# Patient Record
Sex: FEMALE | Race: WHITE | NOT HISPANIC OR LATINO | Employment: UNEMPLOYED | URBAN - METROPOLITAN AREA
[De-identification: names, ages, dates, MRNs, and addresses within clinical notes are randomized per-mention and may not be internally consistent; named-entity substitution may affect disease eponyms.]

---

## 2018-08-01 ENCOUNTER — OFFICE VISIT (OUTPATIENT)
Dept: OBGYN CLINIC | Facility: CLINIC | Age: 38
End: 2018-08-01

## 2018-08-01 VITALS
HEIGHT: 63 IN | DIASTOLIC BLOOD PRESSURE: 82 MMHG | BODY MASS INDEX: 18.78 KG/M2 | SYSTOLIC BLOOD PRESSURE: 118 MMHG | WEIGHT: 106 LBS

## 2018-08-01 DIAGNOSIS — Z11.3 SCREEN FOR STD (SEXUALLY TRANSMITTED DISEASE): ICD-10-CM

## 2018-08-01 DIAGNOSIS — Z11.51 SCREENING FOR HUMAN PAPILLOMAVIRUS (HPV): ICD-10-CM

## 2018-08-01 DIAGNOSIS — R30.0 BURNING WITH URINATION: ICD-10-CM

## 2018-08-01 DIAGNOSIS — N89.8 VAGINA ITCHING: ICD-10-CM

## 2018-08-01 DIAGNOSIS — Z01.419 ENCNTR FOR GYN EXAM (GENERAL) (ROUTINE) W/O ABN FINDINGS: Primary | ICD-10-CM

## 2018-08-01 PROCEDURE — 87624 HPV HI-RISK TYP POOLED RSLT: CPT | Performed by: PHYSICIAN ASSISTANT

## 2018-08-01 PROCEDURE — 87491 CHLMYD TRACH DNA AMP PROBE: CPT | Performed by: PHYSICIAN ASSISTANT

## 2018-08-01 PROCEDURE — G0145 SCR C/V CYTO,THINLAYER,RESCR: HCPCS | Performed by: PHYSICIAN ASSISTANT

## 2018-08-01 PROCEDURE — 87086 URINE CULTURE/COLONY COUNT: CPT | Performed by: PHYSICIAN ASSISTANT

## 2018-08-01 PROCEDURE — 87660 TRICHOMONAS VAGIN DIR PROBE: CPT | Performed by: PHYSICIAN ASSISTANT

## 2018-08-01 PROCEDURE — 87186 SC STD MICRODIL/AGAR DIL: CPT | Performed by: PHYSICIAN ASSISTANT

## 2018-08-01 PROCEDURE — 99385 PREV VISIT NEW AGE 18-39: CPT | Performed by: PHYSICIAN ASSISTANT

## 2018-08-01 PROCEDURE — 87591 N.GONORRHOEAE DNA AMP PROB: CPT | Performed by: PHYSICIAN ASSISTANT

## 2018-08-01 PROCEDURE — 87510 GARDNER VAG DNA DIR PROBE: CPT | Performed by: PHYSICIAN ASSISTANT

## 2018-08-01 PROCEDURE — 87480 CANDIDA DNA DIR PROBE: CPT | Performed by: PHYSICIAN ASSISTANT

## 2018-08-01 PROCEDURE — 87077 CULTURE AEROBIC IDENTIFY: CPT | Performed by: PHYSICIAN ASSISTANT

## 2018-08-01 RX ORDER — IBUPROFEN 200 MG
TABLET ORAL EVERY 6 HOURS PRN
COMMUNITY
End: 2019-03-28 | Stop reason: ALTCHOICE

## 2018-08-01 RX ORDER — FLUCONAZOLE 100 MG/1
100 TABLET ORAL DAILY
Qty: 7 TABLET | Refills: 0 | Status: SHIPPED | OUTPATIENT
Start: 2018-08-01 | End: 2018-08-08

## 2018-08-01 NOTE — ASSESSMENT & PLAN NOTE
Yeast on wet mount, rx diflucan sent via EMR, 7 day course due to recurrent symptoms  Affirm sent out to check for concurrent BV infection, will call if abnormal  rec rephresh proB and 1 wk abstinence   Call or RTO if symptoms worsen or do not improve

## 2018-08-01 NOTE — PROGRESS NOTES
Bladder discomfort dull  uti x 2 relieved with antibiotics  Burning and vag dryness ,   Yeast inf cottage cheese d/c treated monistat supp 3 days ago,   No discharge now  Assessment/Plan  Problem List Items Addressed This Visit     Encntr for gyn exam (general) (routine) w/o abn findings - Primary     Annual exam and pap performed, will call if abnormal or send letter if normal  RTO 1 year         Relevant Orders    Liquid-based pap, screening    Vagina itching     Yeast on wet mount, rx diflucan sent via EMR, 7 day course due to recurrent symptoms  Affirm sent out to check for concurrent BV infection, will call if abnormal  rec rephresh proB and 1 wk abstinence   Call or RTO if symptoms worsen or do not improve         Relevant Medications    fluconazole (DIFLUCAN) 100 mg tablet    Other Relevant Orders    Chlamydia/GC amplified DNA by PCR    VAGINOSIS DNA PROBE (AFFIRM)    Burning with urination     Urine C&S sent out will call if abnormal         Relevant Orders    Urine culture      Other Visit Diagnoses     Screening for human papillomavirus (HPV)        Relevant Orders    Liquid-based pap, screening    Screen for STD (sexually transmitted disease)        Relevant Orders    Chlamydia/GC amplified DNA by PCR        [facility]  # [id number] used to get history and examine patient    Vidya Mccormack is a 45 y o  female who presents for a new patient annual GYN exam   Periods are regular every 28-30 days  Dysmenorrhea:mild, occurring first 1-2 days of flow  She denies intermenstrual bleeding, spotting, or discharge  She reports that she is sexually active with 1 partner and using vasectomy for contraception  Patient also c/o vaginal burning, dryness and irritation x 1 months  Patient had UTI x 2 and on 2 different antibiotics  After done with antibiotics had thick white cheesy discharge and vaginal itching  She used an OTC yeast treatment with some relief    After the OTC treatment the discharge and itching resolved but the irritation and dryness remained  Symptoms aggravated with intercourse and she denies any alleviating factors  She denies any odor, abdominal pain, fever, chills, or dysuria  However patient would like to confirm that UTI resolved  She is taking a GI OTC probiotic and cranberry pills  Last Pap smear: unknown  Regular self breast exam: no    Family history of uterine or ovarian cancer: yes mother  Family history of breast cancer: no  Family history of colon cancer: no    Menstrual History:  OB History      Para Term  AB Living    1 1       1    SAB TAB Ectopic Multiple Live Births            1         Patient's last menstrual period was 2018 (exact date)  Period Pattern: Regular  Menstrual Flow: Moderate    Past Medical History:   Diagnosis Date    Bacterial cystitis     Hematuria     Pelvic pain      Past Surgical History:   Procedure Laterality Date    NO PAST SURGERIES       Family History   Problem Relation Age of Onset    Uterine cancer Mother     No Known Problems Father        Review of Systems  Review of Systems   Constitutional: Negative for chills, fatigue and fever  Respiratory: Negative for shortness of breath  Cardiovascular: Negative for chest pain  Gastrointestinal: Negative for abdominal pain  Genitourinary: Positive for vaginal pain  Negative for dysuria, frequency, genital sores, hematuria, pelvic pain, urgency, vaginal bleeding and vaginal discharge  Negative for breast pain or lumps  Negative for stress urinary incontinence  See HPI   Neurological: Negative for headaches  Objective   /82 (BP Location: Left arm, Patient Position: Sitting, Cuff Size: Standard)   Ht 5' 2 5" (1 588 m)   Wt 48 1 kg (106 lb)   LMP 2018 (Exact Date)   BMI 19 08 kg/m²     Physical Exam   Constitutional: She is oriented to person, place, and time  She appears well-developed and well-nourished     Neck: No thyromegaly present  Cardiovascular: Normal rate and regular rhythm  Pulmonary/Chest: Effort normal and breath sounds normal  Right breast exhibits no mass, no nipple discharge, no skin change and no tenderness  Left breast exhibits no mass, no nipple discharge, no skin change and no tenderness  Abdominal: Soft  There is no tenderness  There is no rebound and no guarding  Genitourinary: There is no rash, tenderness or lesion on the right labia  There is no rash, tenderness or lesion on the left labia  Uterus is not enlarged and not tender  Cervix exhibits no motion tenderness and no discharge  Right adnexum displays no mass and no tenderness  Left adnexum displays no mass and no tenderness  No bleeding in the vagina  Vaginal discharge (thick white d/c, pH 4 5, negative KOH whiff , no clue cells on wet mount, (+) scant yeast with hyphae) found  Genitourinary Comments: Pap performed   Neurological: She is alert and oriented to person, place, and time  Psychiatric: She has a normal mood and affect  Nursing note and vitals reviewed

## 2018-08-02 DIAGNOSIS — N39.0 URINARY TRACT INFECTION WITHOUT HEMATURIA, SITE UNSPECIFIED: Primary | ICD-10-CM

## 2018-08-02 DIAGNOSIS — N30.00 ACUTE CYSTITIS WITHOUT HEMATURIA: Primary | ICD-10-CM

## 2018-08-02 LAB
CHLAMYDIA DNA CVX QL NAA+PROBE: NORMAL
N GONORRHOEA DNA GENITAL QL NAA+PROBE: NORMAL

## 2018-08-02 RX ORDER — NITROFURANTOIN 25; 75 MG/1; MG/1
100 CAPSULE ORAL 2 TIMES DAILY
Qty: 10 CAPSULE | Refills: 0 | Status: SHIPPED | OUTPATIENT
Start: 2018-08-02 | End: 2018-08-07

## 2018-08-02 NOTE — TELEPHONE ENCOUNTER
Called pt - spoke with pt  Indira Yen - explained the results  He stated he would like to speak with the provider due to the antibiotics being prescribed

## 2018-08-02 NOTE — TELEPHONE ENCOUNTER
----- Message from Jesu Agustin PA-C sent at 8/2/2018  6:00 PM EDT -----  Please call the patient regarding her abnormal result  Will send in rx for UTI   Pt does not speak [address]   will likely answer phone

## 2018-08-02 NOTE — TELEPHONE ENCOUNTER
pts  Slime Rodriguez called again asking for a call back TONIGHT regarding hiS wife & possibly looking to talk to a Doctor

## 2018-08-03 ENCOUNTER — TELEPHONE (OUTPATIENT)
Dept: OBGYN CLINIC | Facility: CLINIC | Age: 38
End: 2018-08-03

## 2018-08-03 LAB
BACTERIA UR CULT: ABNORMAL
CANDIDA RRNA VAG QL PROBE: NEGATIVE
G VAGINALIS RRNA GENITAL QL PROBE: NEGATIVE
HPV RRNA GENITAL QL NAA+PROBE: NORMAL
LAB AP GYN PRIMARY INTERPRETATION: NORMAL
Lab: NORMAL
T VAGINALIS RRNA GENITAL QL PROBE: NEGATIVE

## 2018-08-03 RX ORDER — CEPHALEXIN 250 MG/1
500 CAPSULE ORAL 4 TIMES DAILY
Qty: 28 CAPSULE | Refills: 0 | Status: SHIPPED | OUTPATIENT
Start: 2018-08-03 | End: 2018-08-10

## 2018-08-03 NOTE — TELEPHONE ENCOUNTER
I spoke with Wendy Martínez, he wanted us to prescribe antral, I told him our doctor is not familiar with this and we would refer him to a PCP  I told him if she is having liver pain she should go to urgent care  I did offer to send her for lab work, he did not say anything

## 2018-08-03 NOTE — TELEPHONE ENCOUNTER
This was in our task bin so I was not sure if Hien called or was asking us to call  I called and s/w  Corinne Crew  He did speak with Hien last night and wanted to speak with her again  I told him she was not available today  He said his wife took 1 antibiotic and had liver pain last night  He said she had liver failure in 2009-toxic hepatitis  He is asking us to prescribe antral for her liver  I asked him if she has a physician here that follows her liver, he said no, not in this country  Please advise

## 2018-08-03 NOTE — TELEPHONE ENCOUNTER
I don't know what antral is  I can refer them to a PCP or there is urgent care  We can also always order a liver panel for her- a cmp to check her enzymes

## 2018-08-03 NOTE — TELEPHONE ENCOUNTER
Nichelle Rees has called back again  He keeps asking for a different medication  He says that after she took 1 pill her liver hurts, the back of her head hurts and her hands and legs feel heavy  I asked if she has swelling, he said she did not mention  He keeps saying her liver hurts and wants a different pill or one she only has to take once a day   Please advise

## 2018-08-03 NOTE — TELEPHONE ENCOUNTER
I spoke with  Rosa Krause again  I told him we can prescribe Keflex instead  Then he wanted to know if she could just take the original one once a day  He told me she took it once and her liver hurt so I asked him why she would want to do that  I also told him if her liver continues to hurt to go to the ER   Order placed for Keflex

## 2018-09-17 DIAGNOSIS — N39.0 FREQUENT UTI: Primary | ICD-10-CM

## 2018-09-17 RX ORDER — NITROFURANTOIN 25; 75 MG/1; MG/1
CAPSULE ORAL
Qty: 10 CAPSULE | Refills: 0 | Status: SHIPPED | OUTPATIENT
Start: 2018-09-17 | End: 2019-03-28 | Stop reason: ALTCHOICE

## 2018-09-17 NOTE — TELEPHONE ENCOUNTER
Called pt  back - explained to him that the pt needs to go for a u/a and urine culture  The orders are in pt chart  Also advised the Hien recommends that pt see a urologist due to recurrent UTI's  Rx for Macrobid in pt chart  Please sign  Thanks!

## 2018-09-17 NOTE — TELEPHONE ENCOUNTER
Needs urine culture and sensitivity, give lab order then can send in rx for macrobid  Also rec seeing urologist as I believe this is 3rd or 4th UTI in a few months

## 2018-09-17 NOTE — TELEPHONE ENCOUNTER
Spoke with pt  Dee Mcgarry - patient was seen at the beginning of August - was prescribed Keflex  Patient states the symptoms are back - it started on Saturday  She has frequent urination, abdominal pressure and burning when urinating  Patient would like to have a refill of the Keflex  Please advise  Thanks!

## 2018-09-24 ENCOUNTER — TELEPHONE (OUTPATIENT)
Dept: UROLOGY | Facility: MEDICAL CENTER | Age: 38
End: 2018-09-24

## 2018-09-24 NOTE — TELEPHONE ENCOUNTER
Complaint/Diagnosis:Frequent UTI'S    Insurance:[employer] PPO    History of Cancer:NO    Previous Urologist:NO    Outside testing/where:     If yes,what kind:    Records requested/where:    Preferred location:[facility]

## 2018-10-03 ENCOUNTER — TELEPHONE (OUTPATIENT)
Dept: UROLOGY | Facility: MEDICAL CENTER | Age: 38
End: 2018-10-03

## 2018-10-05 ENCOUNTER — OFFICE VISIT (OUTPATIENT)
Dept: UROLOGY | Facility: MEDICAL CENTER | Age: 38
End: 2018-10-05
Payer: COMMERCIAL

## 2018-10-05 VITALS
SYSTOLIC BLOOD PRESSURE: 118 MMHG | BODY MASS INDEX: 20.58 KG/M2 | HEIGHT: 61 IN | WEIGHT: 109 LBS | DIASTOLIC BLOOD PRESSURE: 70 MMHG

## 2018-10-05 DIAGNOSIS — N39.0 RECURRENT UTI: Primary | ICD-10-CM

## 2018-10-05 PROBLEM — N30.01 ACUTE CYSTITIS WITH HEMATURIA: Status: ACTIVE | Noted: 2018-10-05

## 2018-10-05 LAB
SL AMB  POCT GLUCOSE, UA: ABNORMAL
SL AMB LEUKOCYTE ESTERASE,UA: ABNORMAL
SL AMB POCT BILIRUBIN,UA: ABNORMAL
SL AMB POCT BLOOD,UA: ABNORMAL
SL AMB POCT CLARITY,UA: CLEAR
SL AMB POCT COLOR,UA: YELLOW
SL AMB POCT KETONES,UA: ABNORMAL
SL AMB POCT NITRITE,UA: ABNORMAL
SL AMB POCT PH,UA: 7.5
SL AMB POCT SPECIFIC GRAVITY,UA: 1.01
SL AMB POCT URINE PROTEIN: ABNORMAL
SL AMB POCT UROBILINOGEN: 0.2

## 2018-10-05 PROCEDURE — 99244 OFF/OP CNSLTJ NEW/EST MOD 40: CPT | Performed by: UROLOGY

## 2018-10-05 PROCEDURE — 81003 URINALYSIS AUTO W/O SCOPE: CPT | Performed by: UROLOGY

## 2018-10-05 PROCEDURE — 87086 URINE CULTURE/COLONY COUNT: CPT | Performed by: UROLOGY

## 2018-10-05 RX ORDER — FLUCONAZOLE 100 MG/1
100 TABLET ORAL DAILY
Qty: 15 TABLET | Refills: 0 | Status: SHIPPED | OUTPATIENT
Start: 2018-10-05 | End: 2018-10-20

## 2018-10-05 RX ORDER — NITROFURANTOIN MACROCRYSTALS 50 MG/1
CAPSULE ORAL
Qty: 90 CAPSULE | Refills: 2 | Status: SHIPPED | OUTPATIENT
Start: 2018-10-05 | End: 2019-03-28 | Stop reason: ALTCHOICE

## 2018-10-05 RX ORDER — FLUCONAZOLE 100 MG/1
100 TABLET ORAL DAILY
Qty: 15 TABLET | Refills: 1 | Status: SHIPPED | OUTPATIENT
Start: 2018-10-05 | End: 2018-10-05 | Stop reason: SDUPTHER

## 2018-10-06 LAB — BACTERIA UR CULT: NORMAL

## 2018-10-10 ENCOUNTER — TELEPHONE (OUTPATIENT)
Dept: UROLOGY | Facility: MEDICAL CENTER | Age: 38
End: 2018-10-10

## 2018-10-10 NOTE — TELEPHONE ENCOUNTER
Spoke with Charlie Juarez and gave him negative u/c results  Told him pt should have DON done as scheduled this week  She can start the Nitrofurantoin as ordered also  He understands all instructions

## 2018-10-12 ENCOUNTER — HOSPITAL ENCOUNTER (OUTPATIENT)
Dept: ULTRASOUND IMAGING | Facility: HOSPITAL | Age: 38
Discharge: HOME | End: 2018-10-12
Attending: UROLOGY
Payer: COMMERCIAL

## 2018-10-12 DIAGNOSIS — N30.01 ACUTE CYSTITIS WITH HEMATURIA: ICD-10-CM

## 2018-10-12 PROCEDURE — 51798 US URINE CAPACITY MEASURE: CPT

## 2018-10-22 ENCOUNTER — TELEPHONE (OUTPATIENT)
Dept: UROLOGY | Facility: MEDICAL CENTER | Age: 38
End: 2018-10-22

## 2018-10-22 DIAGNOSIS — R30.0 DYSURIA: Primary | ICD-10-CM

## 2018-10-22 NOTE — TELEPHONE ENCOUNTER
MD Rajwinder Saba [facility]  For Urology [place] [address] [facility]             Sonogram normal    I had recommended cystoscopy, I am not sure if she decided to have it or not      LMOM to return call for results and to see if she decided on the cystoscopy

## 2018-10-23 DIAGNOSIS — N30.01 ACUTE CYSTITIS WITH HEMATURIA: Primary | ICD-10-CM

## 2018-10-23 NOTE — TELEPHONE ENCOUNTER
Zoë Driscoll returned call and stated that she stopped the macrodantin because it gave her a cough and chest pain  Pt also requested UC having dysuria  Pt wants to hold off on the cystoscopy until get the medication correct  Will forward to Dr Shereen Stewart to advise

## 2018-10-30 NOTE — TELEPHONE ENCOUNTER
Segundo calling back regarding this issue  The patient never heard from the doctor and they want to know what is going on  Please call Segundo back

## 2018-10-30 NOTE — TELEPHONE ENCOUNTER
Spoke to   Pt never repeated culture that was ordered 10/23  Explained Dr Velia Fuchs awaiting results prior to ordering med  since last culture was neg  Pt and  expected call from Dr Velia Fuchs re: different medication   will have pt do culture and take to [facility]   instructed to call the office if no response in 72 hours

## 2018-11-02 NOTE — PROGRESS NOTES
Assessment/Plan:  1  Needs evaluation for recurrent UTIs with renal ultrasound and cystoscopy  2   Start Macrodantin 50 mg per day suppression  3   She is not sure if she wants cystoscopy, she will call  4   Culture urine today  No problem-specific Assessment & Plan notes found for this encounter  Diagnoses and all orders for this visit:    Acute cystitis with hematuria  -     POCT urine dip auto non-scope  -     US kidney and bladder with pvr; Future  -     nitrofurantoin (MACRODANTIN) 50 mg capsule; ONE DAILY  -     Urine culture  -     Discontinue: fluconazole (DIFLUCAN) 100 mg tablet; Take 1 tablet (100 mg total) by mouth daily for 15 days ONE DAILY AS NEEDED FOR YEAST INFECTION  -     fluconazole (DIFLUCAN) 100 mg tablet; Take 1 tablet (100 mg total) by mouth daily for 15 days ONE DAILY AS NEEDED FOR YEAST INFECTION          Subjective:      Patient ID: Daniel Aguirre is a 45 y o  female  Evaluation for recurrent UTIs  September one, had E coli infection with urgency, frequency, burning  Several other episodes of similar symptoms, usually clear with antibiotics  Unclear father cultures done  No hematuria stones prior urology procedures  Good urinary stream and control, no incontinence  She says she had normal ultrasound of kidneys a year ago in [facility]  The following portions of the patient's history were reviewed and updated as appropriate: allergies, current medications, past family history, past medical history, past social history, past surgical history and problem list     Review of Systems   All other systems reviewed and are negative  Objective:      /70 (BP Location: Left arm, Patient Position: Sitting)   Ht 5' 1" (1 549 m)   Wt 49 4 kg (109 lb)   BMI 20 60 kg/m²          Physical Exam   Constitutional: She is oriented to person, place, and time  She appears well-developed and well-nourished  No distress  HENT:   Head: Normocephalic and atraumatic  Eyes: Conjunctivae are normal    Cardiovascular: Normal rate and regular rhythm  Pulmonary/Chest: Effort normal and breath sounds normal  No respiratory distress  She has no wheezes  Abdominal: Soft  Bowel sounds are normal  She exhibits no distension and no mass  There is no tenderness  Neurological: She is alert and oriented to person, place, and time  Skin: Skin is warm and dry  She is not diaphoretic

## 2018-11-09 ENCOUNTER — APPOINTMENT (OUTPATIENT)
Dept: LAB | Facility: CLINIC | Age: 38
End: 2018-11-09
Payer: COMMERCIAL

## 2018-11-09 DIAGNOSIS — R30.0 DYSURIA: ICD-10-CM

## 2018-11-09 DIAGNOSIS — N39.0 FREQUENT UTI: ICD-10-CM

## 2018-11-09 DIAGNOSIS — N30.01 ACUTE CYSTITIS WITH HEMATURIA: ICD-10-CM

## 2018-11-09 LAB
BACTERIA UR QL AUTO: NORMAL /HPF
BILIRUB UR QL STRIP: NEGATIVE
CLARITY UR: CLEAR
COLOR UR: YELLOW
GLUCOSE UR STRIP-MCNC: NEGATIVE MG/DL
HGB UR QL STRIP.AUTO: NEGATIVE
HYALINE CASTS #/AREA URNS LPF: NORMAL /LPF
KETONES UR STRIP-MCNC: NEGATIVE MG/DL
LEUKOCYTE ESTERASE UR QL STRIP: NEGATIVE
NITRITE UR QL STRIP: NEGATIVE
NON-SQ EPI CELLS URNS QL MICRO: NORMAL /HPF
PH UR STRIP.AUTO: 6.5 [PH] (ref 4.5–8)
PROT UR STRIP-MCNC: NEGATIVE MG/DL
RBC #/AREA URNS AUTO: NORMAL /HPF
SP GR UR STRIP.AUTO: 1 (ref 1–1.03)
UROBILINOGEN UR QL STRIP.AUTO: 0.2 E.U./DL
WBC #/AREA URNS AUTO: NORMAL /HPF

## 2018-11-09 PROCEDURE — 87086 URINE CULTURE/COLONY COUNT: CPT

## 2018-11-09 PROCEDURE — 81001 URINALYSIS AUTO W/SCOPE: CPT

## 2018-11-10 LAB — BACTERIA UR CULT: NORMAL

## 2018-11-14 ENCOUNTER — TELEPHONE (OUTPATIENT)
Dept: OBGYN CLINIC | Facility: CLINIC | Age: 38
End: 2018-11-14

## 2018-11-14 NOTE — TELEPHONE ENCOUNTER
Pt's  calling for culture results we were disconnected before I could tell him I would send triage message   [phone]

## 2018-11-14 NOTE — TELEPHONE ENCOUNTER
Spoke to pt's spouse and stated the culture done on 11/9 was negative  Pt's spouse said pt stopped the Nitrofurantoin because she had coughing  Pt's spouse would like to know what the next step would be and would not make another appt until another doctor looks at the labs and last encounter  Will ask Dr Omar Campos for advisement

## 2018-11-14 NOTE — TELEPHONE ENCOUNTER
Pt's spouse, Kedar Chavez, left vm message today for staff to call him back regarding Pt's recent urine culture result  He can be reached @ [phone]

## 2018-11-15 NOTE — TELEPHONE ENCOUNTER
LMOM for pt's  to call back  Per Dr Regina Torres last note pt should have a cystoscopy  After looking at the notes Dr Porsha Agustin agrees  Pt needs to make a cysto appt

## 2018-11-16 NOTE — TELEPHONE ENCOUNTER
Spoke to Pt's  and gave him Dr Barrientos Slider advisement per Dr Wojciech Masterson last note  Pt needs to schedule a cystoscopy to find out what is going on in the bladder  Pt's  will relay the message and wcb

## 2019-03-28 ENCOUNTER — OFFICE VISIT (OUTPATIENT)
Dept: OBGYN CLINIC | Facility: CLINIC | Age: 39
End: 2019-03-28
Payer: COMMERCIAL

## 2019-03-28 VITALS — DIASTOLIC BLOOD PRESSURE: 60 MMHG | SYSTOLIC BLOOD PRESSURE: 110 MMHG | WEIGHT: 113.8 LBS | BODY MASS INDEX: 21.5 KG/M2

## 2019-03-28 DIAGNOSIS — R10.30 LOWER ABDOMINAL PAIN: Primary | ICD-10-CM

## 2019-03-28 LAB
BACTERIA UR QL AUTO: NORMAL /HPF
BILIRUB UR QL STRIP: NEGATIVE
CLARITY UR: CLEAR
COLOR UR: YELLOW
GLUCOSE UR STRIP-MCNC: NEGATIVE MG/DL
HGB UR QL STRIP.AUTO: NEGATIVE
HYALINE CASTS #/AREA URNS LPF: NORMAL /LPF
KETONES UR STRIP-MCNC: NEGATIVE MG/DL
LEUKOCYTE ESTERASE UR QL STRIP: NEGATIVE
NITRITE UR QL STRIP: NEGATIVE
NON-SQ EPI CELLS URNS QL MICRO: NORMAL /HPF
PH UR STRIP.AUTO: 6.5 [PH]
PROT UR STRIP-MCNC: NEGATIVE MG/DL
RBC #/AREA URNS AUTO: NORMAL /HPF
SL AMB  POCT GLUCOSE, UA: NORMAL
SL AMB LEUKOCYTE ESTERASE,UA: NORMAL
SL AMB POCT BILIRUBIN,UA: NORMAL
SL AMB POCT BLOOD,UA: NORMAL
SL AMB POCT CLARITY,UA: CLEAR
SL AMB POCT COLOR,UA: NORMAL
SL AMB POCT KETONES,UA: NORMAL
SL AMB POCT NITRITE,UA: NORMAL
SL AMB POCT PH,UA: 5
SL AMB POCT SPECIFIC GRAVITY,UA: 1.01
SL AMB POCT URINE PROTEIN: NORMAL
SL AMB POCT UROBILINOGEN: NORMAL
SP GR UR STRIP.AUTO: 1 (ref 1–1.03)
UROBILINOGEN UR QL STRIP.AUTO: 0.2 E.U./DL
WBC #/AREA URNS AUTO: NORMAL /HPF

## 2019-03-28 PROCEDURE — 87086 URINE CULTURE/COLONY COUNT: CPT | Performed by: OBSTETRICS & GYNECOLOGY

## 2019-03-28 PROCEDURE — 87591 N.GONORRHOEAE DNA AMP PROB: CPT | Performed by: OBSTETRICS & GYNECOLOGY

## 2019-03-28 PROCEDURE — 87491 CHLMYD TRACH DNA AMP PROBE: CPT | Performed by: OBSTETRICS & GYNECOLOGY

## 2019-03-28 PROCEDURE — 99213 OFFICE O/P EST LOW 20 MIN: CPT | Performed by: OBSTETRICS & GYNECOLOGY

## 2019-03-28 PROCEDURE — 81001 URINALYSIS AUTO W/SCOPE: CPT | Performed by: OBSTETRICS & GYNECOLOGY

## 2019-03-28 RX ORDER — METRONIDAZOLE 500 MG/1
500 TABLET ORAL 2 TIMES DAILY
Qty: 14 TABLET | Refills: 0 | Status: SHIPPED | OUTPATIENT
Start: 2019-03-28 | End: 2019-04-04

## 2019-03-28 RX ORDER — FLUCONAZOLE 150 MG/1
150 TABLET ORAL ONCE
Qty: 1 TABLET | Refills: 0 | Status: SHIPPED | OUTPATIENT
Start: 2019-03-28 | End: 2019-03-28

## 2019-03-28 NOTE — PROGRESS NOTES
Chief Complaint   Patient presents with   Jessica Courser Gynecology Problem     Patient is complaining of intermittent pelvic pain for 1 week now  Her  is with her to help with translation  Chele Pascual is here for pelvic pain  She states she has had this bilateral pelvic pain for the past 3 weeks however it has been worse over the past week  It will come and go, but she will have the pain daily  She denies any fevers or chills  No dysuria urgency or frequency  She is not constipated however she does sometimes feel like she is having gas pain  Sometimes the pain will radiate to her lower back or her growing  The other day it did feel like she had pulled a muscle in her groin  She is sexually active and does not have worsening pain with intercourse  She does not have worsening pain with her periods  She is sexually active with her   She has had a few bladder infections in the past       Her  is here to translate per her request she speaks [place] she declined translation with her phone service        No current outpatient medications on file        Allergies   Allergen Reactions    Penicillins GI Intolerance    Nitrofurantoin Cough         Past Medical History:   Diagnosis Date    Bacterial cystitis     Hematuria     Pelvic pain          Past Surgical History:   Procedure Laterality Date    NO PAST SURGERIES      OVARIAN CYST REMOVAL Left          OB History    Para Term  AB Living   1 1       1   SAB TAB Ectopic Multiple Live Births           1      # Outcome Date GA Lbr Christian/2nd Weight Sex Delivery Anes PTL Lv   1 Para     F Vag-Spont   GILDARDO         Social History     Socioeconomic History    Marital status: /Civil Union     Spouse name: None    Number of children: None    Years of education: None    Highest education level: None   Occupational History    None   Social Needs    Financial resource strain: None    Food insecurity:     Worry: None Inability: None    Transportation needs:     Medical: None     Non-medical: None   Tobacco Use    Smoking status: Never Smoker    Smokeless tobacco: Never Used   Substance and Sexual Activity    Alcohol use: No    Drug use: No    Sexual activity: Yes     Partners: Male     Birth control/protection: Condom Male, Male Sterilization     Comment:    Lifestyle    Physical activity:     Days per week: None     Minutes per session: None    Stress: None   Relationships    Social connections:     Talks on phone: None     Gets together: None     Attends Jain service: None     Active member of club or organization: None     Attends meetings of clubs or organizations: None     Relationship status: None    Intimate partner violence:     Fear of current or ex partner: None     Emotionally abused: None     Physically abused: None     Forced sexual activity: None   Other Topics Concern    None   Social History Narrative    None         Family History   Problem Relation Age of Onset    Uterine cancer Mother     No Known Problems Father             Review Of Systems    REVIEW OF SYSTEMS:   General: no fevers   Skin: no rashes, lesions, itching,   HEENT: no frequent headaches, hearing changes, sore throat, dental problems, sinus problems     Lymph: no tender or swollen lymph nodes    Breasts: no lumps, nipple discharge, breast pain   Resp: no SOB, cough, wheezing   Cardio: no chest pain, palpitations, edema   GI: no constipation,  reflux, blood in stool, leakage of stool    : no frequency, dysuria, flank pain, hematuria, incontinence, retention         Musk: no weakness, arthralgia, joint swelling, ROM limitations   Endocrine: no polydypsia, polyphagia, polyuria, heat/cold intolerance, hot flashes, dry skin   Psych: no depression, anxiety, panic attacks                PHYSICAL EXAM:   /60 (BP Location: Right arm, Patient Position: Sitting, Cuff Size: Standard)   Wt 51 6 kg (113 lb 12 8 oz)   LMP 03/10/2019   BMI 21 50 kg/m²    Constitutional: Well-developed, well-nourished female in no acute distress   Respiratory: Clear to auscultation bilaterally  Good air movement with normal work of    breathing  Cardiovascular: Regular rate and rhythm  Extremities grossly normal, nontender with no edema; pulses regular   Gastrointestinal: Soft, nontender, nondistended  No masses or hernias appreciated  No hepatosplenomegaly  No fluid wave  No rebound or guarding  Back: No CVAT Bilaterally   Genitourinary:           External Genitalia: Normal female genitalia, no lesions      Vagina: Well-rugated, small discharge  Cervix: No lesions, normal size and consistency; positive cervical motion tenderness       Uterus: Normal size and contour; smooth, mobile, NT   Adnexa/Parametria: No masses; no parametrial nodularity; no tenderness   Perineum/Anus: No lesions        Assessment/Plan:       Duane Ervin is a 44 y o  female  for bilateral pelvic pain, bacterial vaginosis  UA negative, urine culture sent  Wet prep done in the office showed positive clue cells no pseudohyphae or yeast   Gonorrhea and Chlamydia sent  Will have patient get pelvic ultrasound to rule out other causes of her pelvic pain but at this point it appears to be cervicitis  Will treat with Flagyl Rx    Patient asked for Diflucan in case she gets a yeast infection which was also sent to the pharmacy  Precautions were discussed for her to return with any worsening or new symptoms

## 2019-03-29 LAB
BACTERIA UR CULT: NORMAL
C TRACH DNA SPEC QL NAA+PROBE: NEGATIVE
N GONORRHOEA DNA SPEC QL NAA+PROBE: NEGATIVE

## 2019-04-01 ENCOUNTER — TELEPHONE (OUTPATIENT)
Dept: OBGYN CLINIC | Facility: CLINIC | Age: 39
End: 2019-04-01

## 2019-04-15 ENCOUNTER — HOSPITAL ENCOUNTER (OUTPATIENT)
Dept: RADIOLOGY | Facility: HOSPITAL | Age: 39
Discharge: HOME | End: 2019-04-15
Attending: OBSTETRICS & GYNECOLOGY
Payer: COMMERCIAL

## 2019-04-15 DIAGNOSIS — R10.30 LOWER ABDOMINAL PAIN: ICD-10-CM

## 2019-04-15 PROCEDURE — 76830 TRANSVAGINAL US NON-OB: CPT

## 2019-04-15 PROCEDURE — 76856 US EXAM PELVIC COMPLETE: CPT

## 2019-04-16 ENCOUNTER — TELEPHONE (OUTPATIENT)
Dept: OBGYN CLINIC | Facility: CLINIC | Age: 39
End: 2019-04-16

## 2019-04-26 ENCOUNTER — TELEPHONE (OUTPATIENT)
Dept: OBGYN CLINIC | Facility: CLINIC | Age: 39
End: 2019-04-26

## 2021-01-05 ENCOUNTER — OFFICE VISIT (OUTPATIENT)
Dept: OBGYN CLINIC | Facility: CLINIC | Age: 41
End: 2021-01-05
Payer: COMMERCIAL

## 2021-01-05 VITALS
TEMPERATURE: 98.3 F | HEIGHT: 63 IN | WEIGHT: 118 LBS | DIASTOLIC BLOOD PRESSURE: 78 MMHG | SYSTOLIC BLOOD PRESSURE: 126 MMHG | BODY MASS INDEX: 20.91 KG/M2

## 2021-01-05 DIAGNOSIS — N89.8 VAGINAL DISCHARGE: Primary | ICD-10-CM

## 2021-01-05 PROCEDURE — 99213 OFFICE O/P EST LOW 20 MIN: CPT | Performed by: OBSTETRICS & GYNECOLOGY

## 2021-01-06 NOTE — PROGRESS NOTES
Assessment/Plan:     Diagnoses and all orders for this visit:    Vaginal discharge      35 yo female  Vaginal discahrge  Pelvic pain   Prior 1   Partner vasectomy  recurrent UTI   Plan   Reassurance given   Menstrual chart 2m   rto in 2m f/w and annual exam       Subjective:      Patient ID: Stu Espinal is a 36 y o  female  Pelvic Pain  The patient's primary symptoms include pelvic pain and vaginal discharge  This is a new problem  The current episode started in the past 7 days  The problem occurs intermittently  The problem has been resolved  The patient is experiencing no pain  Pertinent negatives include no anorexia, constipation, diarrhea, dysuria, fever, frequency, hematuria, nausea, urgency or vomiting  The vaginal discharge was bloody  The vaginal bleeding is spotting  She has not been passing clots  The symptoms are aggravated by activity and heavy lifting  She has tried nothing for the symptoms  She is sexually active  No, her partner does not have an STD  She uses vasectomy for contraception  Her menstrual history has been regular  The following portions of the patient's history were reviewed and updated as appropriate: allergies, current medications, past family history, past medical history, past social history, past surgical history and problem list     Review of Systems   Constitutional: Negative for fever  Gastrointestinal: Negative for anorexia, constipation, diarrhea, nausea and vomiting  Genitourinary: Positive for pelvic pain and vaginal discharge  Negative for dysuria, frequency, hematuria and urgency  Objective:      /78 (BP Location: Left arm, Patient Position: Sitting, Cuff Size: Adult)   Temp 98 3 °F (36 8 °C) (Temporal)   Ht 5' 2 5" (1 588 m)   Wt 53 5 kg (118 lb)   LMP 2020 (Exact Date)   BMI 21 24 kg/m²          Physical Exam  Constitutional:       Appearance: She is well-developed  Abdominal:      General: There is no distension  Palpations: Abdomen is soft  Tenderness: There is no abdominal tenderness  Genitourinary:     Labia:         Right: No rash, tenderness or lesion  Left: No rash, tenderness or lesion  Vagina: No signs of injury  No vaginal discharge, erythema or tenderness  Cervix: No cervical motion tenderness, discharge or friability  Adnexa:         Right: No mass, tenderness or fullness  Left: No mass, tenderness or fullness  Neurological:      Mental Status: She is alert and oriented to person, place, and time     Psychiatric:         Behavior: Behavior normal        wet mount normal epithelial cell   And neg whiff test

## 2021-03-27 ENCOUNTER — OFFICE VISIT (OUTPATIENT)
Dept: URGENT CARE | Age: 41
End: 2021-03-27
Payer: COMMERCIAL

## 2021-03-27 VITALS
RESPIRATION RATE: 16 BRPM | OXYGEN SATURATION: 99 % | WEIGHT: 128 LBS | HEART RATE: 76 BPM | HEIGHT: 62 IN | BODY MASS INDEX: 23.55 KG/M2 | TEMPERATURE: 97.2 F

## 2021-03-27 DIAGNOSIS — J06.9 VIRAL URI WITH COUGH: Primary | ICD-10-CM

## 2021-03-27 PROCEDURE — 87635 SARS-COV-2 COVID-19 AMP PRB: CPT | Performed by: PHYSICIAN ASSISTANT

## 2021-03-27 PROCEDURE — 99213 OFFICE O/P EST LOW 20 MIN: CPT | Performed by: PHYSICIAN ASSISTANT

## 2021-03-27 NOTE — PROGRESS NOTES
[address] [facility]        NAME: Charlene Rosario is a 39 y o  female  : 1980    MRN: [id number]  DATE: 2021  TIME: 10:11 AM    Assessment and Plan   Viral URI with cough [J06 9]  1  Viral URI with cough  Novel Coronavirus (Covid-19),PCR [facility] - Office Collection         Patient Instructions        Patient Instructions   Stable vitals and benign exam  COVID test collected, isolate until results received and are negative  If positive, isolate for 10 days from symptom onset  Must be fever free and symptoms improving  May take tylenol to help with muscle aches and headaches  ER if worsening shortness of breath or any distress      Follow up with PCP in 3-5 days  Proceed to  ER if symptoms worsen  Chief Complaint     Chief Complaint   Patient presents with    COVID-19     pt reports cough, fatigue and chills that staretd 3 days ago          History of Present Illness       40 y/o F presents c/o cold sx x 3 days  Fatigue, myalgias, headache and dry cough  No fever, chills, n/v/d, abdominal pain, SOB, chest pain  No tx attempted   had a positive COVID contact at work      Review of Systems   Review of Systems   Constitutional: Positive for appetite change and fatigue  Negative for chills and fever  HENT: Positive for congestion  Negative for ear pain, facial swelling, hearing loss, postnasal drip, rhinorrhea, sinus pressure, sinus pain, sore throat and voice change  Eyes: Negative for pain, redness and visual disturbance  Respiratory: Positive for cough  Negative for chest tightness, shortness of breath and wheezing  Cardiovascular: Negative for chest pain, palpitations and leg swelling  Gastrointestinal: Negative for abdominal pain, constipation, diarrhea, nausea and vomiting  Genitourinary: Negative for difficulty urinating, dysuria and frequency  Musculoskeletal: Negative for myalgias  Skin: Negative for color change, pallor and wound     Neurological: Negative for dizziness, syncope, numbness and headaches  Hematological: Negative for adenopathy  Current Medications     No current outpatient medications on file  Current Allergies     Allergies as of 03/27/2021 - Reviewed 03/27/2021   Allergen Reaction Noted    Penicillins GI Intolerance 08/01/2018    Nitrofurantoin Cough 11/14/2018            The following portions of the patient's history were reviewed and updated as appropriate: allergies, current medications, past family history, past medical history, past social history, past surgical history and problem list      Past Medical History:   Diagnosis Date    Bacterial cystitis     Fibroid of cervix     cervical    Hematuria     Liver disease     Pelvic pain        Past Surgical History:   Procedure Laterality Date    NO PAST SURGERIES      OVARIAN CYST REMOVAL Left 2005       Family History   Problem Relation Age of Onset    Uterine cancer Mother     No Known Problems Father          Medications have been verified  Objective   Pulse 76   Temp (!) 97 2 °F (36 2 °C)   Resp 16   Ht 5' 2" (1 575 m)   Wt 58 1 kg (128 lb)   LMP 03/20/2021 (Exact Date)   SpO2 99%   BMI 23 41 kg/m²   Patient's last menstrual period was 03/20/2021 (exact date)  Physical Exam     Physical Exam  Constitutional:       General: She is not in acute distress  Appearance: She is well-developed  She is not diaphoretic  HENT:      Head: Normocephalic and atraumatic  Right Ear: Hearing, tympanic membrane, ear canal and external ear normal  No decreased hearing noted  No tenderness  No middle ear effusion  Left Ear: Hearing, tympanic membrane, ear canal and external ear normal       Nose: Mucosal edema and rhinorrhea present  Right Sinus: No maxillary sinus tenderness or frontal sinus tenderness  Left Sinus: No maxillary sinus tenderness or frontal sinus tenderness  Mouth/Throat:      Pharynx: Posterior oropharyngeal erythema present   No oropharyngeal exudate or uvula swelling  Tonsils: No tonsillar abscesses  Eyes:      General: No scleral icterus  Right eye: No discharge  Left eye: No discharge  Conjunctiva/sclera: Conjunctivae normal       Pupils: Pupils are equal, round, and reactive to light  Neck:      Musculoskeletal: Normal range of motion and neck supple  Cardiovascular:      Rate and Rhythm: Normal rate and regular rhythm  Heart sounds: Normal heart sounds  No murmur  No friction rub  No gallop  Pulmonary:      Effort: Pulmonary effort is normal  No respiratory distress  Breath sounds: Normal breath sounds  No wheezing or rales  Musculoskeletal: Normal range of motion  Lymphadenopathy:      Cervical: No cervical adenopathy  Skin:     General: Skin is warm and dry  Coloration: Skin is not pale  Findings: No erythema or rash  Neurological:      Mental Status: She is alert and oriented to person, place, and time  Cranial Nerves: No cranial nerve deficit

## 2021-03-27 NOTE — PATIENT INSTRUCTIONS
Stable vitals and benign exam  COVID test collected, isolate until results received and are negative  If positive, isolate for 10 days from symptom onset   Must be fever free and symptoms improving  May take tylenol to help with muscle aches and headaches  ER if worsening shortness of breath or any distress

## 2021-03-28 ENCOUNTER — TELEPHONE (OUTPATIENT)
Dept: URGENT CARE | Age: 41
End: 2021-03-28

## 2021-03-28 LAB — SARS-COV-2 RNA RESP QL NAA+PROBE: NEGATIVE

## 2022-09-09 ENCOUNTER — OFFICE VISIT (OUTPATIENT)
Dept: OBGYN CLINIC | Facility: CLINIC | Age: 42
End: 2022-09-09
Payer: COMMERCIAL

## 2022-09-09 VITALS
WEIGHT: 123 LBS | DIASTOLIC BLOOD PRESSURE: 60 MMHG | BODY MASS INDEX: 21.79 KG/M2 | HEIGHT: 63 IN | SYSTOLIC BLOOD PRESSURE: 126 MMHG

## 2022-09-09 DIAGNOSIS — Z01.419 ENCOUNTER FOR GYNECOLOGICAL EXAMINATION WITHOUT ABNORMAL FINDING: Primary | ICD-10-CM

## 2022-09-09 DIAGNOSIS — Z80.9 FAMILY HISTORY OF CANCER: ICD-10-CM

## 2022-09-09 DIAGNOSIS — R10.2 PELVIC PAIN: ICD-10-CM

## 2022-09-09 DIAGNOSIS — N39.0 URINARY TRACT INFECTION WITHOUT HEMATURIA, SITE UNSPECIFIED: ICD-10-CM

## 2022-09-09 PROCEDURE — G0476 HPV COMBO ASSAY CA SCREEN: HCPCS | Performed by: OBSTETRICS & GYNECOLOGY

## 2022-09-09 PROCEDURE — G0145 SCR C/V CYTO,THINLAYER,RESCR: HCPCS | Performed by: OBSTETRICS & GYNECOLOGY

## 2022-09-09 PROCEDURE — S0612 ANNUAL GYNECOLOGICAL EXAMINA: HCPCS | Performed by: OBSTETRICS & GYNECOLOGY

## 2022-09-09 PROCEDURE — S0610 ANNUAL GYNECOLOGICAL EXAMINA: HCPCS | Performed by: OBSTETRICS & GYNECOLOGY

## 2022-09-09 PROCEDURE — 87086 URINE CULTURE/COLONY COUNT: CPT | Performed by: OBSTETRICS & GYNECOLOGY

## 2022-09-09 RX ORDER — NITROFURANTOIN 25; 75 MG/1; MG/1
100 CAPSULE ORAL 2 TIMES DAILY
Qty: 14 CAPSULE | Refills: 1 | Status: SHIPPED | OUTPATIENT
Start: 2022-09-09 | End: 2022-09-16

## 2022-09-09 NOTE — PROGRESS NOTES
Sobia Amanda is a 43 y o  female who presents for annual well woman exam  Periods are regular every 28-30 days, lasting 4 days  No intermenstrual bleeding, spotting, or discharge  Current contraception: vasectomy  History of abnormal Pap smear: no  Mother ovarian cancer, now bladder cancer   Menstrual History:  OB History        1    Para   1    Term                AB        Living   1       SAB        IAB        Ectopic        Multiple        Live Births   1                  Patient's last menstrual period was 2022 (exact date)  Period Cycle (Days): 28  Period Duration (Days): 4  Period Pattern: Regular  Menstrual Flow: Moderate  Dysmenorrhea: None    The following portions of the patient's history were reviewed and updated as appropriate: allergies, current medications, past family history, past medical history, past social history, past surgical history and problem list     Review of Systems  Review of Systems   Constitutional: Negative for appetite change, chills, fatigue and fever  Respiratory: Negative for apnea, cough, chest tightness and shortness of breath  Cardiovascular: Negative for chest pain, palpitations and leg swelling  Gastrointestinal: Negative for abdominal pain, constipation, diarrhea, nausea and vomiting  Genitourinary: Positive for dysuria  Negative for difficulty urinating, flank pain, frequency, hematuria, urgency, vaginal bleeding, vaginal discharge and vaginal pain  Uti symptoms after period   Neurological: Negative for seizures, syncope, light-headedness, numbness and headaches  Psychiatric/Behavioral: Negative for agitation and confusion            Objective      /60 (BP Location: Left arm, Patient Position: Sitting, Cuff Size: Adult)   Ht 5' 2 5" (1 588 m)   Wt 55 8 kg (123 lb)   LMP 2022 (Exact Date)   BMI 22 14 kg/m²     Physical Exam  OBGyn Exam     General:   alert and oriented, in no acute distress, alert, appears stated age and cooperative   Heart: regular rate and rhythm, S1, S2 normal, no murmur, click, rub or gallop   Lungs: clear to auscultation bilaterally   Abdomen: soft, non-tender, without masses or organomegaly   Vulva: normal   Vagina: normal mucosa, normal discharge   Cervix: no cervical motion tenderness and no lesions   Uterus: normal size   Adnexa:  Breast Exam:  normal adnexa  breasts appear normal, no suspicious masses, no skin or nipple changes or axillary nodes  Assessment      @well woman@   77-year-old female  Annual exam   Prior 1 vaginal delivery   UTI symptom after menses every month  Lower pelvic pain and pressure   Plan   Pap/HPV  Diet/exercise  Calcium/vitamin-D   Urine culture   Genetic testing referral secondary to family history of ovarian cancer   Pelvic ultrasound check for any uterine abnormality in etiology of her lower pelvic pain that is intermittent   Macrobid 1 pill after her menses secondary to UTI symptom monthly after menses  Up-to-date with mammogram  Return to office in 3 month follow-up on UTI symptom and pelvic pain   All questions answered  There are no Patient Instructions on file for this visit

## 2022-09-10 LAB — BACTERIA UR CULT: ABNORMAL

## 2022-09-12 LAB
HPV HR 12 DNA CVX QL NAA+PROBE: NEGATIVE
HPV16 DNA CVX QL NAA+PROBE: NEGATIVE
HPV18 DNA CVX QL NAA+PROBE: NEGATIVE

## 2022-09-13 ENCOUNTER — TELEPHONE (OUTPATIENT)
Dept: GENETICS | Facility: CLINIC | Age: 42
End: 2022-09-13

## 2022-09-13 NOTE — TELEPHONE ENCOUNTER
I called Rosita Garcia to schedule a new patient appointment with the [facility]       Outcome:   I left a voice message encouraging the patient to call the genetics team at [phone] to schedule this appointment  Follow-up:   At this time the referral will be closed and we will wait to hear back from the patient regarding scheduling this appointment

## 2022-09-15 LAB
LAB AP GYN PRIMARY INTERPRETATION: NORMAL
Lab: NORMAL

## 2022-09-28 ENCOUNTER — TELEPHONE (OUTPATIENT)
Dept: OBGYN CLINIC | Facility: CLINIC | Age: 42
End: 2022-09-28

## 2022-09-28 DIAGNOSIS — N39.0 URINARY TRACT INFECTION WITHOUT HEMATURIA, SITE UNSPECIFIED: Primary | ICD-10-CM

## 2022-09-28 RX ORDER — NITROFURANTOIN 25; 75 MG/1; MG/1
100 CAPSULE ORAL 2 TIMES DAILY
Qty: 20 CAPSULE | Refills: 0 | Status: SHIPPED | OUTPATIENT
Start: 2022-09-28 | End: 2022-10-05

## 2022-09-28 NOTE — TELEPHONE ENCOUNTER
Patient was seen on 9/9/2022 and is awaiting medication to be called into pharmacy ,Macrobid 1 pill after her menses secondary to UTI symptom monthly after menses

## 2022-09-28 NOTE — TELEPHONE ENCOUNTER
Spoke with patient and made aware, also spoke with Pharmacist to clarify prescription, read off to pharmacist , instructions, Patient has her me menses now, so the suppressive therapy will start with her next menses

## 2022-10-12 PROBLEM — J06.9 VIRAL URI WITH COUGH: Status: RESOLVED | Noted: 2021-03-27 | Resolved: 2022-10-12

## 2023-10-08 ENCOUNTER — TELEPHONE (OUTPATIENT)
Dept: OTHER | Facility: OTHER | Age: 43
End: 2023-10-08

## 2023-10-19 ENCOUNTER — OFFICE VISIT (OUTPATIENT)
Dept: NEUROLOGY | Facility: CLINIC | Age: 43
End: 2023-10-19
Payer: COMMERCIAL

## 2023-10-19 VITALS
HEART RATE: 102 BPM | HEIGHT: 63 IN | DIASTOLIC BLOOD PRESSURE: 80 MMHG | WEIGHT: 127 LBS | BODY MASS INDEX: 22.5 KG/M2 | SYSTOLIC BLOOD PRESSURE: 140 MMHG

## 2023-10-19 DIAGNOSIS — R20.2 NUMBNESS AND TINGLING: ICD-10-CM

## 2023-10-19 DIAGNOSIS — G50.0 TRIGEMINAL NEURALGIA: Primary | ICD-10-CM

## 2023-10-19 DIAGNOSIS — R20.0 NUMBNESS AND TINGLING: ICD-10-CM

## 2023-10-19 PROCEDURE — 99205 OFFICE O/P NEW HI 60 MIN: CPT | Performed by: PSYCHIATRY & NEUROLOGY

## 2023-10-19 RX ORDER — ESCITALOPRAM OXALATE 5 MG/1
5 TABLET ORAL DAILY
COMMUNITY
Start: 2023-10-11 | End: 2024-01-09

## 2023-10-19 RX ORDER — GABAPENTIN 300 MG/1
CAPSULE ORAL
Qty: 90 CAPSULE | Refills: 3 | Status: SHIPPED | OUTPATIENT
Start: 2023-10-19

## 2023-10-19 RX ORDER — GABAPENTIN 100 MG/1
100 CAPSULE ORAL 3 TIMES DAILY
COMMUNITY
Start: 2023-10-05 | End: 2023-10-19

## 2023-10-19 NOTE — PATIENT INSTRUCTIONS
Lifestyle adjustments. Irrespective of treatment modalities applied, trigger control and lifestyle modification are indispensable to the successful management of migraine. This is especially important in children, adolescents, or pregnant women where drug treatments must be especially limited. Although there is no robust evidence for most of the recommendations, most are general health measures that, given the lack of adverse effects and the benefit for general well-being, we consider should be recommended in all patients. Avoid triggers. Many patients attribute the onset or worsening of pain to specific triggers such as stress, sleep changes, food, or atmospheric changes, among others. It is even possible that the relationship occurs inversely, so that premonitory symptoms such as sleep disturbances and appetite 48 to 72 hours before the onset of pain can be misinterpreted by the patient as the trigger of migraine attacks. The therapeutic implications of this relationship are also unclear. There are possible triggers such as sleep deprivation, fasting, or certain foods that can be easily avoidable. But avoiding other triggers can lead to very restrictive lifestyles with a reduction in quality of life that does not outweigh the potential beneficial.    Sleep. Another complex relationship is headache and sleep. An excess or lack of sleep can trigger migraine attacks and at the same time rest is one of the most used treatments to improve the symptoms of the migraine attack. Additionally, migraine and other headaches occur comorbidly with sleep disorders.  Patients with chronic migraine have a higher prevalence of sleep disorders, specifically poor sleep habits and nonrestorative rest.    Regarding general sleep measures for patients with headache, it is recommended to define regular sleep schedules that allow 8 hours of rest per day, insisting that they remain constant also during the weekend; have dinner 4 hours before bedtime and avoid liquids in the last two hours; and eliminate naps and avoid using screens, television, reading, or listening to music in bed. A nonpharmacological intervention to improve sleep habits can improve headache frequency and even reverse chronic to episodic migraine. Diet. In the scientific literature, but especially in the informative websites and magazines, multiple and varied diets are proposed that aim to reduce the frequency of headaches. There are two main approaches: elimination diets, which consist of suppressing potentially triggering foods such as chocolate, alcohol, cheese, nuts, or citrus fruits and diets that provide high or low amounts of certain components, ie, rich in vitamin B12, B6, or D or low in histamine, lactose, or fatty acids. The studies are not very rigorous and most do not have a control group (). In addition, it must be considered that food triggers were only associated with onset of headache in less than 10% of the participants. Dietary recommendations for patients with migraine should be the same as for the general population with special emphasis on the prevention of obesity, which is a factor related to headache chronification. It is recommendable to have a varied diet, eating five meals a day to avoid periods of prolonged fasting and incorporating water intake to reach around 2.5 liters per day, which should be increased in case of physical activity or increase in temperature or humidity. Specific diets should be recommended solely based on whether there are other comorbidities in the patient. Caffeine at moderate doses (< 400mg/day: equivalent to two cups of coffee) does not seem to have a negative effect on headache frequency although it should be taken regularly to avoid withdrawal headaches.     Although patients with migraine headaches and cluster headaches may be more susceptible to alcohol as a precipitant, there is no evidence to recommend abstinence from alcohol in all patients. Individual predisposition and cultural factors must be considered. Exercise. Aerobic exercise can prevent or reduce symptoms of multiple chronic diseases, including headache. With some methodological limitations, there are studies that demonstrate benefits of aerobic exercise as a therapeutic intervention to reduce the frequency and intensity of headaches, as well as the quality of life measured by questionnaires. Exercise can have a beneficial effect on headaches directly but also indirectly, improving sleep quality, mood, cardiovascular function, and preventing weight gain. In addition, it can improve the control of other diseases frequently comorbid with headache such as obesity, hypertension, anxiety, depression, or sleep disorders (). The clinical benefit of yoga as an add-on therapy in patients with episodic migraine has been demonstrated.              Vitamins for headache  Mg 400 mg daily for headache  RiboFlavin 400 mg daily for headache

## 2023-10-19 NOTE — PROGRESS NOTES
NEUROLOGY OUTPATIENT - NEW PATIENT VISIT NOTE        NAME: Miguelito Booker  MRN: [id number]  : 1980     TODAY'S DATE: 10/19/23         HISTORY OF PRESENT ILLNESS (HPI):    Ms. Duy Masterson is a 37 y.o. female presenting with left facial pain     Headache description:    Onset: sudden -10 years back it started when she was in [place] then it happened again 1 year back, when she was on the plane coming to [address] [facility], she took Gabapentin which helped her symptoms but   Location: left-sided unilateral and retro-orbital  Quality: sharp and shooting  Intensity: 10/10 at its worst.   Timing of the day: no particular time of day  Duration: hours to days   Frequency: daily but sometimes it is variable  Associated symptoms: no vomiting , +nausea, +light sensitivity , and +sound sensitivityShe is also complaining of some eye lid paresthesia and some numbness in the back of the head. She is also having some pain in the lower jaw (left side). She was also seen by her dentist and was given Ibuprofen which helped with her symptoms. Ear pain on the left side as well. Presence of Aura: No  Family history of migraine headaches: No  History of neck and head trauma: No  Smoking status: No  Oral contraceptive use: No  Positional component: Yes        Triggers:  -Food (eg missing meals or inciting food types):Yes, stress       Life style factors:  -Hydration: adequate  -Sleep: inadequate --she is waking 2 times at night time.   -Exercise: no effect   -Stress: makes it worse (mother had cancer)        Treatment:  -OTC medication use for headache: Tylenol  and Ibuprofen   -Prescribed medication use for headache:Gabapentin helped but when she stopped the medicine the headache came back. When she started the Gabapentin again 100 mg TID, the sharp pain has stopped since she started the medicine. She feels that the eye pain is still there. She also went for massage therapy, which also helped with her symptoms.       Red Flags for headache:  Age <5 or >50: No  First worst headaches: Yes  Maximal at intensity: No  Change in pattern: Yes, more frequent but better with gabapentin   New headache in pregnancy, cancer or immunocompromised patient: No  Loss of consciousness or convulsions: No  Headache triggered by exertion, Valsalva maneuver or sexual activity: No  Abnormal exam: please see below in Neurological examination. CURRENT OUTPATIENT MEDICATIONS:    Inder Natarajan has a current medication list which includes the following prescription(s): escitalopram and gabapentin. PHYSICAL EXAMINATION:   VITAL SIGNS:  height is 5' 2.5" (1.588 m) and weight is 57.6 kg (127 lb). Her blood pressure is 140/80 and her pulse is 102. NEUROLOGICAL EXAMINATION:    MENTAL STATUS EXAM: Alert, oriented to time place and person, normal recent memory, normal attention spans and normal concentration. CRANIAL NERVE EXAMINATION:  I Not tested   II Normal visual fields to finger counting. II, Ill,  IV, VI Pupils were symmetric, briskly reactive. No afferent pupillary defect. Eye movements are full without nystagmus. No ptosis. V Facial sensation are decreased in the left V2 and V3 region   VII Facial movements were symmetrical    VIII Hearing was intact   IX, X Intact   XI Shoulder shrug and head turn was normal   XII Tongue protrusion is in the mid line.           MOTOR EXAM:        Arm Right  Left Leg Right  Left   Deltoid 5/5 5/5 lliopsoas 5/5 5/5   Biceps 5/5 5/5 Quads 5/5 5/5   Triceps 5/5 5/5 Hamstrings 5/5 5/5   Wrist Extension 5/5 5/5 Ankle Dorsi Flexion 5/5 5/5   Wrist Flexion 5/5 5/5 Ankle Plantar Flexion 5/5 5/5            DEEP TENDON REFLEXES:     Brachioradialis Biceps Triceps Patellar Achilles   Right 2+ 2+ 2+ 3+ 2+   Left 2+ 2+ 2+ 3+ 3+            SENSORY EXAMINATION:   Sensation to dull touch is decreased in the left upper and lower extremity     COORDINATION:   normal finger to nose testing     GAIT/STATION:   normal DIAGNOSTIC STUDIES:   PERTINENT LABS:           NEUROIMAGING:      no recent neuroimaging          ASSESSMENT AND PLAN:    Ms. Rima Rome is a 37 y. o.female  presenting with left-sided trigeminal neuralgia that initially started about 10 years back for which she underwent an MRI of the brain in [place] which came back within normal limits as per the patient. Patient mentions that the symptoms tend to get worse with stress an anxiety and for the last few months the symptoms have been consistent maybe getting worse along with some decrease sensation in the left upper and lower extremity along with some persistent headaches . She was started on gabapentin which seem to have helped with her symptoms but after stopping the medication her symptoms came back. I would like to get an MRI of the brain to make sure that there is no underlying intracranial pathology like CNS demyelination in the meantime I would recommend continuing with gabapentin at the higher dose. Trigeminal neuralgia  - MRI brain with and without contrast; Future  - gabapentin (Neurontin) 300 mg capsule; Start with 300 mg Gabapentin at bed time, if needed can increase the dose to 300 mg in the morning and 300 mg in the evening. Dispense: 90 capsule; Refill: 3    Once we get the MRI of the brain and its normal and her headaches are well controlled maybe we can try to wean off her gabapentin from 600 mg to 300 mg daily and then may be decreasing it to 200 mg daily over the course of several days but for right now I think the MRI of the brain is important along with controlling her pain     Return in about 4 weeks (around 11/16/2023). Ms. Rima Rome was encouraged to contact our office with any questions or concerns and to contact the clinic or go to the nearest emergency room if symptoms change or worsen.       I have spent a total of 62 min in reviewing and/or ordering tests, medications, or procedures, performing an examination or evaluation, reviewing pertinent history, counseling and educating the patient, referring and/or communicating with other health care professionals, documenting in the EMR and general coordination of care of Ms. Favian Honeycutt  today. Nyla Gross "Tamara Christie" Ival Lanes, MD.   Staff Neurologist,   Neuroimmunology and Neuroinfectious disease  10/19/23     This report has been created through the use of voice recognition/text compilation software. Typographical and content errors may occur with this process. While efforts are made to detect and correct such errors, in some cases errors will persist.  For this reason, wording in this document should be considered in the proper context and not strictly verbatim.   If, when reviewing the document, an error is discovered, please let the office know at [phone]

## 2023-11-10 ENCOUNTER — HOSPITAL ENCOUNTER (OUTPATIENT)
Dept: MRI IMAGING | Facility: HOSPITAL | Age: 43
End: 2023-11-10
Attending: PSYCHIATRY & NEUROLOGY
Payer: COMMERCIAL

## 2023-11-10 DIAGNOSIS — G50.0 TRIGEMINAL NEURALGIA: ICD-10-CM

## 2023-11-10 PROCEDURE — G1004 CDSM NDSC: HCPCS

## 2023-11-10 PROCEDURE — 70553 MRI BRAIN STEM W/O & W/DYE: CPT

## 2023-11-10 PROCEDURE — A9585 GADOBUTROL INJECTION: HCPCS | Performed by: PSYCHIATRY & NEUROLOGY

## 2023-11-10 RX ORDER — GADOBUTROL 604.72 MG/ML
5 INJECTION INTRAVENOUS
Status: COMPLETED | OUTPATIENT
Start: 2023-11-10 | End: 2023-11-10

## 2023-11-10 RX ADMIN — GADOBUTROL 5 ML: 604.72 INJECTION INTRAVENOUS at 16:20

## 2023-11-14 ENCOUNTER — TELEPHONE (OUTPATIENT)
Dept: NEUROLOGY | Facility: CLINIC | Age: 43
End: 2023-11-14

## 2023-11-16 ENCOUNTER — TELEPHONE (OUTPATIENT)
Dept: NEUROLOGY | Facility: CLINIC | Age: 43
End: 2023-11-16

## 2023-11-16 NOTE — TELEPHONE ENCOUNTER
Called patient r/s appointment for 12/14/23 due to provider being out sick. Mailed out appointment card.

## 2024-02-21 PROBLEM — Z01.419 ENCNTR FOR GYN EXAM (GENERAL) (ROUTINE) W/O ABN FINDINGS: Status: RESOLVED | Noted: 2018-08-01 | Resolved: 2024-02-21

## 2024-02-21 PROBLEM — N30.01 ACUTE CYSTITIS WITH HEMATURIA: Status: RESOLVED | Noted: 2018-10-05 | Resolved: 2024-02-21

## 2024-08-19 ENCOUNTER — TELEPHONE (OUTPATIENT)
Age: 44
End: 2024-08-19

## 2024-08-19 NOTE — TELEPHONE ENCOUNTER
Patient's daughter called and stated that patient would like to see another provider for a second opinion for her facial pain.  Whom would Dr Malloy recommend patient should see?    Please assist

## 2024-08-20 NOTE — TELEPHONE ENCOUNTER
Yes, I believe that I had reached out to Dr. Gr at [facility] regarding the BASIA, but she can see anyone in that campus.     Thank you,     Dr. Gama MD.   08/20/24   8:22 AM

## 2024-08-21 NOTE — TELEPHONE ENCOUNTER
Called patient's daughter Rere, is in consent form.  LVM to call office to schedule patient with Dr Gr.

## 2025-07-02 NOTE — TELEPHONE ENCOUNTER
Goal Outcome Evaluation:  Plan of Care Reviewed With: patient        Progress: improving  Outcome Evaluation: No complaints voiced, VSS, NSR on the monitor, sleeping up in chair, legs elevated, resting quietly in her room                              Penicillin is least hepatotoxic but her UTI is not susceptible to it  I can change her to Keflex (not metabolised in the liver) but it is 4 x per day for 7 days       If they are ok with keflex it can be sent in